# Patient Record
Sex: MALE | Race: WHITE | NOT HISPANIC OR LATINO | URBAN - METROPOLITAN AREA
[De-identification: names, ages, dates, MRNs, and addresses within clinical notes are randomized per-mention and may not be internally consistent; named-entity substitution may affect disease eponyms.]

---

## 2017-05-08 NOTE — PATIENT DISCUSSION
NO EVIDENCE OF SCAR TISSUE AFFECTING EYELIDS. BOTHERSOME TO PATIENT.  [id number] [address] FOR FURTHER EVALUATION

## 2017-05-09 NOTE — PATIENT DISCUSSION
PTOSIS OU: I have discussed with the patient that this is a condition that occurs when one or both upper eyelids droop and can fall over the pupil. Ptosis is usually caused by stretching or thinning of the muscle that raises the eyelid. This can lead to loss of peripheral vision. The risks of the procedure include but are not limited to pain, bleeding, infection, further surgery, scarring, injury to eye, dry eye syndrome, and lid asymmetry. Significant swelling and bruising after surgery should be expected. Margin distance is one. The patient was instructed to use ice and heat following the procedure, as well as artificial tears to reduce these symptoms. The patient understands and elects to proceed with ptosis repair. An Rx was given for Erythromycin ointment to use as directed.

## 2017-06-06 NOTE — PATIENT DISCUSSION
1 Week post- op: Continue to use Erythromycin oint on incision for 1 week. Call office if any changes.

## 2017-09-27 NOTE — PATIENT DISCUSSION
(V20.999) Other secondary cataract, bilateral - Assesment : Significant posterior capsule opacification present OD. Patient is symptomatic with visual function affected. Borderline PCO OS - asymptomatic. - Plan : Discussed that opacity is the cause of the decreased vision. Discussed the risks and benefits of performing a YAG Capsulotomy including the risk of floaters, retinal detachment, and retinal swelling. Patient understands to expect floaters after the YAG capsulotomy procedures and understands that they may remain indefinitely. Recommend that patient undergo a YAG Capsulotomy OD (Right Eye). Monitor for changes OS.

## 2017-09-27 NOTE — PATIENT DISCUSSION
(H35.363) Drusen (degenerative) of macula, bilateral - Assesment : Examination revealed Drusen. - Plan : Monitor for changes. Advised patient to call our office with decreased vision or increased symptoms. OCT Today Optical coherence tomography performed.

## 2017-09-27 NOTE — PATIENT DISCUSSION
(H40.013) Open angle with borderline findings, low risk, bilateral - Assesment : Examination revealed suspicion for Open Angle Glaucoma. Large cups OU. Mild Damage to NFL OU, Stable OU - Plan : Monitor for changes. Advised patient to call our office with decreased vision or an increase in symptoms. Visual field performed.

## 2017-11-07 NOTE — PATIENT DISCUSSION
(V50.250) Other secondary cataract, left eye - Assesment : Significant posterior capsule opacification present. - Plan : Monitor for Changes. Advised patient to call our office with decreased vision or increased symptoms.

## 2017-11-07 NOTE — PATIENT DISCUSSION
(H26.491) Other secondary cataract, right eye - Assesment : S/P yag capsulotomy,open - Plan : Monitor for changes. Advised patient to call our office with decreased vision or an increase in symptoms.  Rtc 09/2018 Exam

## 2018-02-26 ENCOUNTER — IMPORTED ENCOUNTER (OUTPATIENT)
Dept: URBAN - METROPOLITAN AREA CLINIC 50 | Facility: CLINIC | Age: 68
End: 2018-02-26

## 2018-03-22 ENCOUNTER — IMPORTED ENCOUNTER (OUTPATIENT)
Dept: URBAN - METROPOLITAN AREA CLINIC 50 | Facility: CLINIC | Age: 68
End: 2018-03-22

## 2018-08-07 ENCOUNTER — IMPORTED ENCOUNTER (OUTPATIENT)
Dept: URBAN - METROPOLITAN AREA CLINIC 50 | Facility: CLINIC | Age: 68
End: 2018-08-07

## 2018-08-10 ENCOUNTER — IMPORTED ENCOUNTER (OUTPATIENT)
Dept: URBAN - METROPOLITAN AREA CLINIC 50 | Facility: CLINIC | Age: 68
End: 2018-08-10

## 2018-08-30 ENCOUNTER — IMPORTED ENCOUNTER (OUTPATIENT)
Dept: URBAN - METROPOLITAN AREA CLINIC 50 | Facility: CLINIC | Age: 68
End: 2018-08-30

## 2018-11-06 ENCOUNTER — IMPORTED ENCOUNTER (OUTPATIENT)
Dept: URBAN - METROPOLITAN AREA CLINIC 50 | Facility: CLINIC | Age: 68
End: 2018-11-06

## 2019-04-16 ENCOUNTER — IMPORTED ENCOUNTER (OUTPATIENT)
Dept: URBAN - METROPOLITAN AREA CLINIC 50 | Facility: CLINIC | Age: 69
End: 2019-04-16

## 2019-06-18 NOTE — PATIENT DISCUSSION
Myathenia Gravis : Ptosis sx was preformed a few years ago has since regressed significantly . WILL REFER PATIENT TO NEURO FOR EVALUATION AND POSSIBLE TREATMENT IF NEEDED . IF MYATHENIA GRAVIS  DIAGNOSIS IS NEGATIVE WILL CONSIDER PTOSIS REPAIR .

## 2019-10-08 ENCOUNTER — IMPORTED ENCOUNTER (OUTPATIENT)
Dept: URBAN - METROPOLITAN AREA CLINIC 50 | Facility: CLINIC | Age: 69
End: 2019-10-08

## 2019-10-29 ENCOUNTER — IMPORTED ENCOUNTER (OUTPATIENT)
Dept: URBAN - METROPOLITAN AREA CLINIC 50 | Facility: CLINIC | Age: 69
End: 2019-10-29

## 2019-10-29 NOTE — PATIENT DISCUSSION
"""Discussed with patient the possibility for glasses after cataract surgery.  Informed patient that ""

## 2020-07-01 ENCOUNTER — IMPORTED ENCOUNTER (OUTPATIENT)
Dept: URBAN - METROPOLITAN AREA CLINIC 50 | Facility: CLINIC | Age: 70
End: 2020-07-01

## 2020-07-10 ENCOUNTER — IMPORTED ENCOUNTER (OUTPATIENT)
Dept: URBAN - METROPOLITAN AREA CLINIC 50 | Facility: CLINIC | Age: 70
End: 2020-07-10

## 2020-07-17 ENCOUNTER — IMPORTED ENCOUNTER (OUTPATIENT)
Dept: URBAN - METROPOLITAN AREA CLINIC 50 | Facility: CLINIC | Age: 70
End: 2020-07-17

## 2021-04-17 ASSESSMENT — PACHYMETRY
OD_CT_UM: 621
OS_CT_UM: 615
OD_CT_UM: 621
OS_CT_UM: 615
OD_CT_UM: 621
OD_CT_UM: 621
OS_CT_UM: 615
OD_CT_UM: 621
OS_CT_UM: 615
OS_CT_UM: 615
OD_CT_UM: 621
OS_CT_UM: 615

## 2021-04-17 ASSESSMENT — VISUAL ACUITY
OS_CC: 20/25+1
OD_BAT: 20/50
OS_CC: J1+@ 18 IN
OD_CC: J1+ (-1)
OS_CC: 20/30
OD_CC: J1+
OD_CC: 20/25-2
OD_CC: 20/20
OD_CC: 20/25-2
OS_OTHER: 20/60. 20/100.
OD_BAT: 20/50
OD_OTHER: 20/50. 20/100.
OS_OTHER: 20/50. 20/200.
OS_CC: 20/20
OD_BAT: 20/50
OS_CC: 20/25-2
OD_CC: 20/30-1
OD_OTHER: 20/50. 20/100.
OS_CC: 20/20-2
OS_CC: 20/20
OS_BAT: 20/60
OD_CC: 20/20
OS_CC: J1+
OD_CC: J1+@ 18 IN
OS_OTHER: 20/50. 20/70.
OD_OTHER: 20/50. 20/60.
OD_CC: 20/30-2
OS_BAT: 20/50
OS_BAT: 20/50
OS_CC: J1+ (-1)

## 2021-04-17 ASSESSMENT — TONOMETRY
OS_IOP_MMHG: 12
OD_IOP_MMHG: 11
OD_IOP_MMHG: 14
OD_IOP_MMHG: 18
OS_IOP_MMHG: 19
OD_IOP_MMHG: 16
OS_IOP_MMHG: 18
OD_IOP_MMHG: 15
OS_IOP_MMHG: 22
OS_IOP_MMHG: 15
OD_IOP_MMHG: 18
OD_IOP_MMHG: 20
OS_IOP_MMHG: 16
OD_IOP_MMHG: 15
OS_IOP_MMHG: 21
OS_IOP_MMHG: 19
OS_IOP_MMHG: 16
OD_IOP_MMHG: 14

## 2021-07-02 ENCOUNTER — PREPPED CHART (OUTPATIENT)
Dept: URBAN - METROPOLITAN AREA CLINIC 52 | Facility: CLINIC | Age: 71
End: 2021-07-02

## 2021-07-06 ENCOUNTER — ROUTINE EXAM (OUTPATIENT)
Dept: URBAN - METROPOLITAN AREA CLINIC 52 | Facility: CLINIC | Age: 71
End: 2021-07-06

## 2021-07-06 DIAGNOSIS — H35.363: ICD-10-CM

## 2021-07-06 DIAGNOSIS — H52.13: ICD-10-CM

## 2021-07-06 DIAGNOSIS — Z01.01: ICD-10-CM

## 2021-07-06 PROCEDURE — 92015 DETERMINE REFRACTIVE STATE: CPT

## 2021-07-06 PROCEDURE — 92134 CPTRZ OPH DX IMG PST SGM RTA: CPT

## 2021-07-06 PROCEDURE — 92014 COMPRE OPH EXAM EST PT 1/>: CPT

## 2021-07-06 RX ORDER — LATANOPROST 50 UG/ML: 1 SOLUTION/ DROPS OPHTHALMIC EVERY EVENING

## 2021-07-06 ASSESSMENT — VISUAL ACUITY
OS_PH: 20/25-3
OS_GLARE: 20/200
OU_CC: J1+ (-2)
OS_CC: 20/30-2
OD_GLARE: 20/40-2
OD_CC: 20/30-2
OD_PH: 20/25-3
OS_GLARE: 20/80-1
OD_GLARE: 20/60-1

## 2021-07-06 ASSESSMENT — TONOMETRY
OS_IOP_MMHG: 12
OD_IOP_MMHG: 15
OD_IOP_MMHG: 11
OS_IOP_MMHG: 15

## 2022-01-14 ENCOUNTER — DIAGNOSTICS ONLY (OUTPATIENT)
Dept: URBAN - METROPOLITAN AREA CLINIC 52 | Facility: CLINIC | Age: 72
End: 2022-01-14

## 2022-01-14 DIAGNOSIS — H40.1131: ICD-10-CM

## 2022-01-14 PROCEDURE — 92083 EXTENDED VISUAL FIELD XM: CPT

## 2022-01-14 PROCEDURE — 92133 CPTRZD OPH DX IMG PST SGM ON: CPT

## 2022-01-14 NOTE — PATIENT DISCUSSION
IOP stable with current medicated drop therapy. Patient to continue Latanoprost QHS OU. Will continue to monitor as scheduled.

## 2022-02-02 ENCOUNTER — FOLLOW UP (OUTPATIENT)
Dept: URBAN - METROPOLITAN AREA CLINIC 53 | Facility: CLINIC | Age: 72
End: 2022-02-02

## 2022-02-02 DIAGNOSIS — H40.1131: ICD-10-CM

## 2022-02-02 PROCEDURE — 92012 INTRM OPH EXAM EST PATIENT: CPT

## 2022-02-02 ASSESSMENT — VISUAL ACUITY
OS_CC: 20/30
OD_PH: 20/25
OU_CC: 20/25
OD_CC: 20/30
OS_PH: 20/25

## 2022-02-02 ASSESSMENT — TONOMETRY
OD_IOP_MMHG: 09
OD_IOP_MMHG: 13
OS_IOP_MMHG: 14
OS_IOP_MMHG: 11

## 2022-08-24 ENCOUNTER — COMPREHENSIVE EXAM (OUTPATIENT)
Dept: URBAN - METROPOLITAN AREA CLINIC 53 | Facility: CLINIC | Age: 72
End: 2022-08-24

## 2022-08-24 DIAGNOSIS — H25.813: ICD-10-CM

## 2022-08-24 DIAGNOSIS — E11.3293: ICD-10-CM

## 2022-08-24 DIAGNOSIS — H35.3131: ICD-10-CM

## 2022-08-24 DIAGNOSIS — H40.1131: ICD-10-CM

## 2022-08-24 PROCEDURE — 92134 CPTRZ OPH DX IMG PST SGM RTA: CPT

## 2022-08-24 PROCEDURE — 92014 COMPRE OPH EXAM EST PT 1/>: CPT

## 2022-08-24 ASSESSMENT — TONOMETRY
OS_IOP_MMHG: 13
OD_IOP_MMHG: 13
OS_IOP_MMHG: 10
OD_IOP_MMHG: 09

## 2022-08-24 ASSESSMENT — VISUAL ACUITY
OD_GLARE: 20/50
OD_GLARE: 20/80
OS_GLARE: 20/70
OS_CC: 20/40
OU_CC: J1+
OD_CC: 20/40-1
OS_GLARE: 20/50

## 2023-08-23 ENCOUNTER — COMPREHENSIVE EXAM (OUTPATIENT)
Dept: URBAN - METROPOLITAN AREA CLINIC 53 | Facility: CLINIC | Age: 73
End: 2023-08-23

## 2023-08-23 DIAGNOSIS — E11.9: ICD-10-CM

## 2023-08-23 DIAGNOSIS — H35.3131: ICD-10-CM

## 2023-08-23 DIAGNOSIS — H40.1131: ICD-10-CM

## 2023-08-23 DIAGNOSIS — H25.813: ICD-10-CM

## 2023-08-23 DIAGNOSIS — H35.363: ICD-10-CM

## 2023-08-23 DIAGNOSIS — Z79.4: ICD-10-CM

## 2023-08-23 PROCEDURE — 92134 CPTRZ OPH DX IMG PST SGM RTA: CPT

## 2023-08-23 PROCEDURE — 99205 OFFICE O/P NEW HI 60 MIN: CPT

## 2023-08-23 ASSESSMENT — VISUAL ACUITY
OS_CC: 20/40
OS_GLARE: 20/60
OD_CC: 20/80
OS_GLARE: 20/80

## 2023-08-23 ASSESSMENT — TONOMETRY
OD_IOP_MMHG: 18
OS_IOP_MMHG: 18

## 2023-09-20 ENCOUNTER — PRE-OP/H&P (OUTPATIENT)
Dept: URBAN - METROPOLITAN AREA CLINIC 53 | Facility: CLINIC | Age: 73
End: 2023-09-20

## 2023-09-20 DIAGNOSIS — H25.813: ICD-10-CM

## 2023-09-20 DIAGNOSIS — H40.1131: ICD-10-CM

## 2023-09-20 PROCEDURE — 92025-3 CORNEAL TOPO, REFUSED

## 2023-09-20 PROCEDURE — 92136 OPHTHALMIC BIOMETRY: CPT

## 2023-09-20 PROCEDURE — PREOP PRE OP VISIT

## 2023-09-20 ASSESSMENT — TONOMETRY
OD_IOP_MMHG: 11
OD_IOP_MMHG: 15
OS_IOP_MMHG: 16
OS_IOP_MMHG: 13

## 2023-09-20 ASSESSMENT — KERATOMETRY
OS_K2POWER_DIOPTERS: 43.62
OS_K1POWER_DIOPTERS: 44.25
OD_AXISANGLE_DEGREES: 8
OD_AXISANGLE2_DEGREES: 98
OS_AXISANGLE2_DEGREES: 88
OD_K1POWER_DIOPTERS: 44.50
OD_K2POWER_DIOPTERS: 43.87
OS_AXISANGLE_DEGREES: 178

## 2023-09-20 ASSESSMENT — VISUAL ACUITY
OD_CC: 20/60-1
OS_CC: 20/30

## 2023-09-28 ENCOUNTER — SURGERY/PROCEDURE (OUTPATIENT)
Dept: URBAN - METROPOLITAN AREA SURGERY 16 | Facility: SURGERY | Age: 73
End: 2023-09-28

## 2023-09-28 ENCOUNTER — POST-OP (OUTPATIENT)
Dept: URBAN - METROPOLITAN AREA CLINIC 53 | Facility: CLINIC | Age: 73
End: 2023-09-28

## 2023-09-28 DIAGNOSIS — Z98.41: ICD-10-CM

## 2023-09-28 DIAGNOSIS — Z96.1: ICD-10-CM

## 2023-09-28 DIAGNOSIS — H25.811: ICD-10-CM

## 2023-09-28 DIAGNOSIS — H40.1111: ICD-10-CM

## 2023-09-28 PROCEDURE — 66991 XCAPSL CTRC RMVL INSJ 1+: CPT

## 2023-09-28 PROCEDURE — 66174 TRLUML DIL AQ O/F CAN W/O ST: CPT

## 2023-09-28 ASSESSMENT — KERATOMETRY
OD_K1POWER_DIOPTERS: 44.50
OS_K2POWER_DIOPTERS: 43.62
OS_AXISANGLE2_DEGREES: 88
OD_AXISANGLE_DEGREES: 8
OS_AXISANGLE_DEGREES: 178
OS_K2POWER_DIOPTERS: 43.62
OD_AXISANGLE_DEGREES: 8
OS_K1POWER_DIOPTERS: 44.25
OS_AXISANGLE2_DEGREES: 88
OD_K2POWER_DIOPTERS: 43.87
OD_K1POWER_DIOPTERS: 44.50
OD_AXISANGLE2_DEGREES: 98
OD_K2POWER_DIOPTERS: 43.87
OS_K1POWER_DIOPTERS: 44.25
OD_AXISANGLE2_DEGREES: 98
OS_AXISANGLE_DEGREES: 178

## 2023-09-28 ASSESSMENT — TONOMETRY: OD_IOP_MMHG: 10

## 2023-09-28 ASSESSMENT — VISUAL ACUITY: OD_SC: 20/100

## 2023-10-04 ENCOUNTER — POST OP/EVAL OF SECOND EYE (OUTPATIENT)
Dept: URBAN - METROPOLITAN AREA CLINIC 53 | Facility: CLINIC | Age: 73
End: 2023-10-04

## 2023-10-04 VITALS — HEART RATE: 66 BPM | HEIGHT: 60 IN | SYSTOLIC BLOOD PRESSURE: 127 MMHG | DIASTOLIC BLOOD PRESSURE: 63 MMHG

## 2023-10-04 DIAGNOSIS — Z96.1: ICD-10-CM

## 2023-10-04 DIAGNOSIS — H40.1131: ICD-10-CM

## 2023-10-04 DIAGNOSIS — H25.812: ICD-10-CM

## 2023-10-04 DIAGNOSIS — Z98.41: ICD-10-CM

## 2023-10-04 PROCEDURE — 92136NC IOL MASTER NO CHARGE

## 2023-10-04 PROCEDURE — 99213 OFFICE O/P EST LOW 20 MIN: CPT | Mod: 24

## 2023-10-04 ASSESSMENT — KERATOMETRY
OS_AXISANGLE_DEGREES: 178
OD_K1POWER_DIOPTERS: 44.50
OS_K2POWER_DIOPTERS: 43.62
OD_K2POWER_DIOPTERS: 43.87
OS_AXISANGLE2_DEGREES: 88
OS_K1POWER_DIOPTERS: 44.25
OD_AXISANGLE2_DEGREES: 98
OD_AXISANGLE_DEGREES: 8

## 2023-10-04 ASSESSMENT — TONOMETRY
OD_IOP_MMHG: 06
OS_IOP_MMHG: 15
OD_IOP_MMHG: 10
OS_IOP_MMHG: 12

## 2023-10-04 ASSESSMENT — VISUAL ACUITY
OD_PH: 20/25
OS_CC: 20/30-2
OS_PH: 20/30
OD_SC: 20/40-2

## 2023-10-12 ENCOUNTER — SURGERY/PROCEDURE (OUTPATIENT)
Dept: URBAN - METROPOLITAN AREA SURGERY 16 | Facility: SURGERY | Age: 73
End: 2023-10-12

## 2023-10-12 DIAGNOSIS — H25.812: ICD-10-CM

## 2023-10-12 DIAGNOSIS — H40.1121: ICD-10-CM

## 2023-10-12 PROCEDURE — 66991 XCAPSL CTRC RMVL INSJ 1+: CPT | Mod: 79,LT

## 2023-10-12 ASSESSMENT — KERATOMETRY
OS_AXISANGLE2_DEGREES: 88
OS_AXISANGLE_DEGREES: 178
OD_K1POWER_DIOPTERS: 44.50
OD_AXISANGLE2_DEGREES: 98
OD_K2POWER_DIOPTERS: 43.87
OD_AXISANGLE_DEGREES: 8
OS_K1POWER_DIOPTERS: 44.25
OS_K2POWER_DIOPTERS: 43.62

## 2023-10-13 ENCOUNTER — POST-OP (OUTPATIENT)
Dept: URBAN - METROPOLITAN AREA CLINIC 53 | Facility: CLINIC | Age: 73
End: 2023-10-13

## 2023-10-13 DIAGNOSIS — Z96.1: ICD-10-CM

## 2023-10-13 DIAGNOSIS — Z98.42: ICD-10-CM

## 2023-10-13 ASSESSMENT — KERATOMETRY
OS_AXISANGLE_DEGREES: 178
OS_K1POWER_DIOPTERS: 44.25
OD_K2POWER_DIOPTERS: 43.87
OD_AXISANGLE2_DEGREES: 98
OD_K1POWER_DIOPTERS: 44.50
OS_K2POWER_DIOPTERS: 43.62
OD_AXISANGLE_DEGREES: 8
OS_AXISANGLE2_DEGREES: 88

## 2023-10-13 ASSESSMENT — VISUAL ACUITY: OS_SC: 20/50

## 2023-10-13 ASSESSMENT — TONOMETRY
OS_IOP_MMHG: 15
OS_IOP_MMHG: 18

## 2023-10-18 ENCOUNTER — POST-OP (OUTPATIENT)
Dept: URBAN - METROPOLITAN AREA CLINIC 53 | Facility: CLINIC | Age: 73
End: 2023-10-18

## 2023-10-18 DIAGNOSIS — H35.373: ICD-10-CM

## 2023-10-18 DIAGNOSIS — Z98.42: ICD-10-CM

## 2023-10-18 DIAGNOSIS — Z96.1: ICD-10-CM

## 2023-10-18 DIAGNOSIS — H35.3131: ICD-10-CM

## 2023-10-18 PROCEDURE — 99024 POSTOP FOLLOW-UP VISIT: CPT

## 2023-10-18 PROCEDURE — 92134 CPTRZ OPH DX IMG PST SGM RTA: CPT

## 2023-10-18 ASSESSMENT — TONOMETRY
OD_IOP_MMHG: 15
OS_IOP_MMHG: 13
OD_IOP_MMHG: 18
OS_IOP_MMHG: 17

## 2023-10-18 ASSESSMENT — VISUAL ACUITY
OD_SC: 20/60
OD_PH: 20/40
OS_PH: 20/25
OS_SC: 20/40

## 2023-11-08 ENCOUNTER — POST-OP (OUTPATIENT)
Dept: URBAN - METROPOLITAN AREA CLINIC 53 | Facility: CLINIC | Age: 73
End: 2023-11-08

## 2023-11-08 DIAGNOSIS — Z98.42: ICD-10-CM

## 2023-11-08 DIAGNOSIS — Z98.41: ICD-10-CM

## 2023-11-08 DIAGNOSIS — H40.1131: ICD-10-CM

## 2023-11-08 PROCEDURE — 99024 POSTOP FOLLOW-UP VISIT: CPT

## 2023-11-08 ASSESSMENT — TONOMETRY
OD_IOP_MMHG: 08
OD_IOP_MMHG: 12
OS_IOP_MMHG: 11
OS_IOP_MMHG: 14

## 2023-11-08 ASSESSMENT — VISUAL ACUITY
OS_SC: 20/40-2
OD_PH: 20/40
OD_SC: 20/60-2
OS_PH: 20/30-1

## 2024-03-05 ENCOUNTER — DIAGNOSTICS ONLY (OUTPATIENT)
Dept: URBAN - METROPOLITAN AREA CLINIC 49 | Facility: LOCATION | Age: 74
End: 2024-03-05

## 2024-03-05 DIAGNOSIS — H40.1131: ICD-10-CM

## 2024-03-05 PROCEDURE — 92083 EXTENDED VISUAL FIELD XM: CPT

## 2024-03-05 PROCEDURE — 92133 CPTRZD OPH DX IMG PST SGM ON: CPT

## 2024-03-27 ENCOUNTER — COMPREHENSIVE EXAM (OUTPATIENT)
Dept: URBAN - METROPOLITAN AREA CLINIC 53 | Facility: CLINIC | Age: 74
End: 2024-03-27

## 2024-03-27 DIAGNOSIS — Z01.01: ICD-10-CM

## 2024-03-27 PROCEDURE — 99214 OFFICE O/P EST MOD 30 MIN: CPT

## 2024-03-27 PROCEDURE — 92015 DETERMINE REFRACTIVE STATE: CPT

## 2024-03-27 ASSESSMENT — VISUAL ACUITY
OD_GLARE: 20/30
OS_GLARE: 20/25
OS_GLARE: 20/25
OS_CC: 20/20
OU_CC: J1+
OD_GLARE: 20/30
OD_CC: 20/25-2

## 2024-03-27 ASSESSMENT — TONOMETRY
OD_IOP_MMHG: 08
OD_IOP_MMHG: 12
OS_IOP_MMHG: 13
OS_IOP_MMHG: 10

## 2024-10-16 ENCOUNTER — FOLLOW UP (OUTPATIENT)
Dept: URBAN - METROPOLITAN AREA CLINIC 53 | Facility: CLINIC | Age: 74
End: 2024-10-16

## 2024-10-16 DIAGNOSIS — H40.1131: ICD-10-CM

## 2024-10-16 PROCEDURE — 99213 OFFICE O/P EST LOW 20 MIN: CPT

## 2025-04-16 ENCOUNTER — DIAGNOSTICS ONLY (OUTPATIENT)
Age: 75
End: 2025-04-16

## 2025-04-16 DIAGNOSIS — H40.1131: ICD-10-CM

## 2025-04-16 PROCEDURE — 92133 CPTRZD OPH DX IMG PST SGM ON: CPT

## 2025-04-16 PROCEDURE — 92083 EXTENDED VISUAL FIELD XM: CPT

## 2025-06-05 ENCOUNTER — COMPREHENSIVE EXAM (OUTPATIENT)
Age: 75
End: 2025-06-05

## 2025-06-05 DIAGNOSIS — H40.1131: ICD-10-CM

## 2025-06-05 DIAGNOSIS — H35.3131: ICD-10-CM

## 2025-06-05 DIAGNOSIS — H02.831: ICD-10-CM

## 2025-06-05 DIAGNOSIS — H02.834: ICD-10-CM

## 2025-06-05 DIAGNOSIS — H52.4: ICD-10-CM

## 2025-06-05 DIAGNOSIS — E11.9: ICD-10-CM

## 2025-06-05 DIAGNOSIS — H35.373: ICD-10-CM

## 2025-06-05 DIAGNOSIS — H18.513: ICD-10-CM

## 2025-06-05 DIAGNOSIS — H26.493: ICD-10-CM

## 2025-06-05 PROCEDURE — 99214 OFFICE O/P EST MOD 30 MIN: CPT

## 2025-06-05 PROCEDURE — 92134 CPTRZ OPH DX IMG PST SGM RTA: CPT

## 2025-06-05 PROCEDURE — 92015 DETERMINE REFRACTIVE STATE: CPT
